# Patient Record
Sex: MALE | Race: WHITE | NOT HISPANIC OR LATINO | Employment: FULL TIME | RURAL
[De-identification: names, ages, dates, MRNs, and addresses within clinical notes are randomized per-mention and may not be internally consistent; named-entity substitution may affect disease eponyms.]

---

## 2020-03-15 ENCOUNTER — OFFICE VISIT (OUTPATIENT)
Dept: RETAIL CLINIC | Facility: CLINIC | Age: 30
End: 2020-03-15

## 2020-03-15 VITALS
WEIGHT: 249 LBS | DIASTOLIC BLOOD PRESSURE: 82 MMHG | SYSTOLIC BLOOD PRESSURE: 130 MMHG | TEMPERATURE: 98.5 F | HEIGHT: 73 IN | HEART RATE: 85 BPM | RESPIRATION RATE: 15 BRPM | OXYGEN SATURATION: 95 % | BODY MASS INDEX: 33 KG/M2

## 2020-03-15 DIAGNOSIS — J06.9 VIRAL UPPER RESPIRATORY TRACT INFECTION: Primary | ICD-10-CM

## 2020-03-15 DIAGNOSIS — Z20.828 EXPOSURE TO INFLUENZA: ICD-10-CM

## 2020-03-15 LAB
EXPIRATION DATE: NORMAL
EXPIRATION DATE: NORMAL
FLUAV AG NPH QL: NEGATIVE
FLUBV AG NPH QL: NEGATIVE
INTERNAL CONTROL: NORMAL
INTERNAL CONTROL: NORMAL
Lab: 1598
Lab: NORMAL
S PYO AG THROAT QL: NEGATIVE

## 2020-03-15 PROCEDURE — 87804 INFLUENZA ASSAY W/OPTIC: CPT | Performed by: NURSE PRACTITIONER

## 2020-03-15 PROCEDURE — 87880 STREP A ASSAY W/OPTIC: CPT | Performed by: NURSE PRACTITIONER

## 2020-03-15 PROCEDURE — 99213 OFFICE O/P EST LOW 20 MIN: CPT | Performed by: NURSE PRACTITIONER

## 2020-03-15 RX ORDER — OSELTAMIVIR PHOSPHATE 75 MG/1
75 CAPSULE ORAL
Qty: 10 CAPSULE | Refills: 0 | Status: SHIPPED | OUTPATIENT
Start: 2020-03-15 | End: 2020-03-20

## 2020-03-15 NOTE — PROGRESS NOTES
"Manolo Ruiz is a 30 y.o. male.   /82   Pulse 85   Temp 98.5 °F (36.9 °C)   Resp 15   Ht 185.4 cm (73\")   Wt 113 kg (249 lb)   SpO2 95%   BMI 32.85 kg/m²   Past Medical History:   Diagnosis Date   • Asthma      No Known Allergies      URI    This is a new problem. The current episode started yesterday (past 2 days). The problem has been unchanged. Associated symptoms include congestion, coughing and rhinorrhea. Pertinent negatives include no abdominal pain, chest pain, diarrhea, dysuria, ear pain, headaches, joint pain, joint swelling, nausea, neck pain, plugged ear sensation, rash, sinus pain, sneezing, sore throat, swollen glands, vomiting or wheezing.   Cough   Associated symptoms include a fever and rhinorrhea. Pertinent negatives include no chest pain, ear pain, headaches, rash, sore throat or wheezing.   Fever    Associated symptoms include congestion and coughing. Pertinent negatives include no abdominal pain, chest pain, diarrhea, ear pain, headaches, nausea, rash, sore throat, urinary pain, vomiting or wheezing.        The following portions of the patient's history were reviewed and updated as appropriate: allergies, current medications, past family history, past medical history, past social history, past surgical history and problem list.    Review of Systems   Constitutional: Positive for fever.   HENT: Positive for congestion and rhinorrhea. Negative for ear pain, sinus pain, sneezing and sore throat.    Respiratory: Positive for cough. Negative for wheezing.    Cardiovascular: Negative for chest pain.   Gastrointestinal: Negative for abdominal pain, diarrhea, nausea and vomiting.   Genitourinary: Negative for dysuria.   Musculoskeletal: Negative for joint pain and neck pain.   Skin: Negative for rash.   Neurological: Negative for headaches.       Objective   Physical Exam   Constitutional: He appears well-developed and well-nourished.  Non-toxic appearance. He does not appear " ill.   HENT:   Head: Normocephalic and atraumatic.   Right Ear: Tympanic membrane and ear canal normal.   Left Ear: Tympanic membrane and ear canal normal.   Nose: Mucosal edema and rhinorrhea present. Right sinus exhibits no maxillary sinus tenderness and no frontal sinus tenderness. Left sinus exhibits no maxillary sinus tenderness and no frontal sinus tenderness.   Mouth/Throat: Uvula is midline. Posterior oropharyngeal erythema present.   Cardiovascular: Regular rhythm and normal heart sounds.   Pulmonary/Chest: Effort normal. He has no wheezes. He has no rhonchi. He has no rales.   Lymphadenopathy:     He has no cervical adenopathy.   Skin: Skin is warm and dry.       Assessment/Plan   Juan Jose was seen today for uri, cough and fever.    Diagnoses and all orders for this visit:    Viral upper respiratory tract infection  -     POC Influenza A / B    Exposure to influenza  -     POC Rapid Strep A    Other orders  -     oseltamivir (TAMIFLU) 75 MG capsule; Take 1 capsule by mouth 2 (Two) Times a Day for 5 days.        Results for orders placed or performed in visit on 03/15/20   POC Rapid Strep A   Result Value Ref Range    Rapid Strep A Screen Negative Negative, VALID, INVALID, Not Performed    Internal Control Passed Passed    Lot Number [id number]     Expiration Date 7,152,022    POC Influenza A / B   Result Value Ref Range    Rapid Influenza A Ag Negative Negative    Rapid Influenza B Ag Negative Negative    Internal Control Passed Passed    Lot Number [id number]     Expiration Date 1,052,023        PT advised to remain out of public for symptoms resolution. If symptoms worsen or do not improve seek a higher level of medical care. Pt states understanding.

## 2024-04-15 PROBLEM — J30.1 SEASONAL ALLERGIC RHINITIS DUE TO POLLEN: Status: ACTIVE | Noted: 2024-04-15

## 2024-04-15 PROBLEM — Z87.891 PERSONAL HISTORY OF TOBACCO USE, PRESENTING HAZARDS TO HEALTH: Status: ACTIVE | Noted: 2024-04-15

## 2024-04-15 PROBLEM — E66.09 CLASS 1 OBESITY DUE TO EXCESS CALORIES WITHOUT SERIOUS COMORBIDITY WITH BODY MASS INDEX (BMI) OF 32.0 TO 32.9 IN ADULT: Status: ACTIVE | Noted: 2024-04-15

## 2024-04-15 PROBLEM — Z00.01 ENCOUNTER FOR GENERAL ADULT MEDICAL EXAMINATION WITH ABNORMAL FINDINGS: Status: ACTIVE | Noted: 2024-04-15

## 2024-04-16 ENCOUNTER — OFFICE VISIT (OUTPATIENT)
Dept: FAMILY MEDICINE CLINIC | Facility: CLINIC | Age: 34
End: 2024-04-16
Payer: COMMERCIAL

## 2024-04-16 VITALS
BODY MASS INDEX: 34.59 KG/M2 | WEIGHT: 261 LBS | HEIGHT: 73 IN | SYSTOLIC BLOOD PRESSURE: 130 MMHG | TEMPERATURE: 97.8 F | DIASTOLIC BLOOD PRESSURE: 84 MMHG

## 2024-04-16 DIAGNOSIS — M54.50 ACUTE MIDLINE LOW BACK PAIN WITHOUT SCIATICA: Primary | ICD-10-CM

## 2024-04-16 DIAGNOSIS — R03.0 PREHYPERTENSION: ICD-10-CM

## 2024-04-16 DIAGNOSIS — J30.1 SEASONAL ALLERGIC RHINITIS DUE TO POLLEN: ICD-10-CM

## 2024-04-16 DIAGNOSIS — M51.26 PROTRUSION OF LUMBAR INTERVERTEBRAL DISC: ICD-10-CM

## 2024-04-16 DIAGNOSIS — E66.09 CLASS 1 OBESITY DUE TO EXCESS CALORIES WITHOUT SERIOUS COMORBIDITY WITH BODY MASS INDEX (BMI) OF 32.0 TO 32.9 IN ADULT: ICD-10-CM

## 2024-04-16 DIAGNOSIS — Z87.891 PERSONAL HISTORY OF TOBACCO USE, PRESENTING HAZARDS TO HEALTH: ICD-10-CM

## 2024-04-16 PROCEDURE — 99204 OFFICE O/P NEW MOD 45 MIN: CPT | Performed by: INTERNAL MEDICINE

## 2024-04-16 RX ORDER — NAPROXEN 500 MG/1
1 TABLET ORAL EVERY 12 HOURS SCHEDULED
COMMUNITY
Start: 2024-04-12

## 2024-04-16 RX ORDER — TIZANIDINE 4 MG/1
1 TABLET ORAL 3 TIMES DAILY
COMMUNITY
Start: 2024-04-12

## 2024-04-16 RX ORDER — METHYLPREDNISOLONE 4 MG/1
TABLET ORAL
COMMUNITY
Start: 2024-04-12

## 2024-04-16 NOTE — ASSESSMENT & PLAN NOTE
Previous 3/4 pack per day x8 years, cessation 2016. I reinforced importance of continued abstinence, especially in light of his history of mild intermittent asthma.

## 2024-04-16 NOTE — ASSESSMENT & PLAN NOTE
CT scan of the lumbar spine is obtained in the ER 4/12/2024 with notable L4/L5 disc protrusion and L5-S1 broad-based disc bulging, with patient with notable onset of acute back pain at the time.  With these notable findings, of which is unclear how much they relate to his acute back pain, most prudent to obtain MRI of the lumbar spine to better clarify details and determine if he might benefit from neurosurgical referral.

## 2024-04-16 NOTE — ASSESSMENT & PLAN NOTE
Blood pressure in clinic at 130/84 on recheck, could be partly exacerbated by pain pattern of the back, but even so in a patient who has increasing weight pattern and relatively and activity, I would like him to check his blood pressure at home multiple times at least weekly until he follow-up in 2 months time to reassess, that will give us better information to determine if he might need medication.  Continue healthy diet, exercise, weight loss, decrease salt intake to benefit blood pressure.

## 2024-04-16 NOTE — ASSESSMENT & PLAN NOTE
Notable pattern obesity with BMI in the 34 range, the patient's had modest weight gain in the last months coinciding with less activity.  Reinforced importance healthy diet, exercise and further weight loss.  Plan to check cholesterol with blood work at follow-up visit.   Acute bronchitis due to COVID-19 virus

## 2024-04-16 NOTE — ASSESSMENT & PLAN NOTE
Acute onset of back pain 4 days ago on 4/12/2024 while he was putting his clothes on, with significant pain and what appears to be the more upper and mid lumbar region, more midline and slightly left-sided.  No concerning neurologic manifestations, by history or exam.  4/12/2024 [facility] ER CT lumbar spine revealed disc bulges at L4/L5 and L5-S1.  Patient has done quite well with conservative manage with improvement in pain pattern but these abnormalities on CT imaging do have some concern and I think it is most prudent to assess further with MRI of the lumbar spine, with consideration he might benefit from neurosurgical evaluation to assess further.  Management per results.  Otherwise with improving pattern of pain, continue and complete course of Medrol Dosepak, transition to naproxen 5 mg twice daily for another handful days, then as needed.  The Zanaflex can be transition to as needed nighttime use as he is overall doing better in that regard.  Advise any worsening.

## 2024-04-16 NOTE — PROGRESS NOTES
Follow Up Office Visit      Date: 2024   Patient Name: Juan Jose Ruiz  : 1990   MRN: [id number]     Chief Complaint:    Chief Complaint   Patient presents with    Follow-up       History of Present Illness: Juan Jose Ruiz is a 34 y.o. male who is here today to follow up with ER follow-up.  Patient not seen in almost 4 years, with 1 visit with myself on 10/26/2020.  Acute onset of back pain 4 days ago on 2024 while he was putting his clothes on, with significant pain and what appears to be the more upper and mid lumbar region, more midline and slightly left-sided.  Due to significance of pain, seen at [facility] ER where he was investigated with a CT lumbar spine showing disc bulges at L4/L5 and L5-S1, given Toradol, oxycodone and dexamethasone in the ER, and discharged home on Medrol Dosepak to be followed by naproxen 5 mg twice daily and given Zanaflex for muscle aches and benefits.  Patient does not describe any previous pattern or any notable back pain.  He has had no bowel or bladder incontinence.  No shooting pain down the legs, though the pain was quite intense but it is improved from about a 10 out of 10 initially now to about a 2-3 out of 10.  Good strength in the legs, no nightly sensation in the legs.  He still has another couple days of the Medrol Dosepak to complete.    Related to allergy symptoms are ongoing historically, some modest flare at this time but he is not bothersome enough to initiate medication, but he has historically used over-the-counter antihistamines with benefit.  Related to obesity pattern, modest increasing weight over the last months coinciding with increased workload, which he knows he needs to continue improving.  He also has a history of smoking, continue cessation.    Subjective      Review of Systems:   Review of Systems    I have reviewed the patients family history, social history, past medical history, past surgical history and have updated  "it as appropriate.     Medications:     Current Outpatient Medications:     methylPREDNISolone (MEDROL) 4 MG dose pack, TAKE 6 TABLETS ON DAY 1 AS DIRECTED ON PACKAGE AND DECREASE BY 1 TAB EACH DAY FOR A TOTAL OF 6 DAYS, Disp: , Rfl:     naproxen (NAPROSYN) 500 MG tablet, Take 1 tablet by mouth Every 12 (Twelve) Hours., Disp: , Rfl:     tiZANidine (ZANAFLEX) 4 MG tablet, Take 1 tablet by mouth 3 times a day., Disp: , Rfl:     Allergies:   No Known Allergies    Objective     Physical Exam: Please see above  Vital Signs:   Vitals:    04/16/24 1637   BP: 130/84   BP Location: Right arm   Patient Position: Sitting   Cuff Size: Adult   Temp: 97.8 °F (36.6 °C)   TempSrc: Temporal   Weight: 118 kg (261 lb)   Height: 185.4 cm (73\")     Facility age limit for growth %hank is 20 years.  Body mass index is 34.43 kg/m².    Physical Exam  Constitutional:       General: He is not in acute distress.     Appearance: Normal appearance. He is not ill-appearing, toxic-appearing or diaphoretic.   HENT:      Right Ear: Ear canal and external ear normal.      Left Ear: Ear canal and external ear normal.      Ears:      Comments: Mild fluid behind the TMs on the right great left side, otherwise clear     Nose: Rhinorrhea present.      Comments: Mild clear rhinorrhea, pale Entresto     Mouth/Throat:      Mouth: Mucous membranes are moist.      Pharynx: Oropharynx is clear. No oropharyngeal exudate or posterior oropharyngeal erythema.   Neck:      Vascular: No carotid bruit.   Cardiovascular:      Rate and Rhythm: Normal rate and regular rhythm.      Pulses: Normal pulses.      Heart sounds: Normal heart sounds. No murmur heard.     No friction rub. No gallop.   Pulmonary:      Effort: Pulmonary effort is normal. No respiratory distress.      Breath sounds: Normal breath sounds. No stridor. No wheezing.   Abdominal:      General: Abdomen is flat. Bowel sounds are normal. There is no distension.      Palpations: Abdomen is soft. There is no " "mass.      Tenderness: There is no abdominal tenderness. There is no guarding or rebound.      Hernia: No hernia is present.   Musculoskeletal:      Cervical back: Neck supple. No tenderness.      Comments: No C/T/L spinous process tenderness.  Paraspinal muscular tenderness at the left greater than right upper and mid lumbar regions, with negative flip sign bilaterally.  Preservation of strength sensation and reflexes in lower extremities.   Lymphadenopathy:      Cervical: No cervical adenopathy.   Skin:     General: Skin is warm and dry.      Capillary Refill: Capillary refill takes less than 2 seconds.   Neurological:      General: No focal deficit present.      Mental Status: He is alert and oriented to person, place, and time. Mental status is at baseline.   Psychiatric:         Mood and Affect: Mood normal.         Behavior: Behavior normal.         Thought Content: Thought content normal.         Procedures    Results:   Labs:   No results found for: \"HGBA1C\", \"CMP\", \"CBCDIFFPANEL\", \"CREAT\", \"TSH\"     Imaging:   No valid procedures specified.     BMI is >= 30 and <35. (Class 1 Obesity). The following options were offered after discussion;: weight loss educational material (shared in after visit summary), exercise counseling/recommendations, and nutrition counseling/recommendations      Assessment / Plan      Assessment/Plan:   Diagnoses and all orders for this visit:    1. Acute midline low back pain without sciatica (Primary)  Assessment & Plan:  Acute onset of back pain 4 days ago on 4/12/2024 while he was putting his clothes on, with significant pain and what appears to be the more upper and mid lumbar region, more midline and slightly left-sided.  No concerning neurologic manifestations, by history or exam.  4/12/2024 [facility] ER CT lumbar spine revealed disc bulges at L4/L5 and L5-S1.  Patient has done quite well with conservative manage with improvement in pain pattern but these " abnormalities on CT imaging do have some concern and I think it is most prudent to assess further with MRI of the lumbar spine, with consideration he might benefit from neurosurgical evaluation to assess further.  Management per results.  Otherwise with improving pattern of pain, continue and complete course of Medrol Dosepak, transition to naproxen 5 mg twice daily for another handful days, then as needed.  The Zanaflex can be transition to as needed nighttime use as he is overall doing better in that regard.  Advise any worsening.    Orders:  -     MRI Lumbar Spine Without Contrast; Future    2. Protrusion of lumbar intervertebral disc  Assessment & Plan:  CT scan of the lumbar spine is obtained in the ER 4/12/2024 with notable L4/L5 disc protrusion and L5-S1 broad-based disc bulging, with patient with notable onset of acute back pain at the time.  With these notable findings, of which is unclear how much they relate to his acute back pain, most prudent to obtain MRI of the lumbar spine to better clarify details and determine if he might benefit from neurosurgical referral.    Orders:  -     MRI Lumbar Spine Without Contrast; Future    3. Prehypertension  Assessment & Plan:  Blood pressure in clinic at 130/84 on recheck, could be partly exacerbated by pain pattern of the back, but even so in a patient who has increasing weight pattern and relatively and activity, I would like him to check his blood pressure at home multiple times at least weekly until he follow-up in 2 months time to reassess, that will give us better information to determine if he might need medication.  Continue healthy diet, exercise, weight loss, decrease salt intake to benefit blood pressure.      4. Seasonal allergic rhinitis due to pollen  Assessment & Plan:  Seasonal pattern of allergies more spring and fall historically although doing better at this time for which she uses infrequent antihistamine over-the-counter with some benefit.   Currently being fairly well.  We could add nasal steroid and/or Singulair in future for breakthrough symptoms.  Additional benefit of saline spray, nasal flushing.      5. Class 1 obesity due to excess calories without serious comorbidity with body mass index (BMI) of 32.0 to 32.9 in adult  Assessment & Plan:  Notable pattern obesity with BMI in the 34 range, the patient's had modest weight gain in the last months coinciding with less activity.  Reinforced importance healthy diet, exercise and further weight loss.  Plan to check cholesterol with blood work at follow-up visit.      6. Personal history of tobacco use, presenting hazards to health  Assessment & Plan:  Previous 3/4 pack per day x8 years, cessation 2016. I reinforced importance of continued abstinence, especially in light of his history of mild intermittent asthma.           Follow Up:   Return in about 2 months (around 6/16/2024) for Annual physical.        Syed Gold MD  [facility]

## 2024-04-25 ENCOUNTER — TELEPHONE (OUTPATIENT)
Dept: FAMILY MEDICINE CLINIC | Facility: CLINIC | Age: 34
End: 2024-04-25
Payer: COMMERCIAL

## 2024-04-25 DIAGNOSIS — M54.50 ACUTE MIDLINE LOW BACK PAIN WITHOUT SCIATICA: ICD-10-CM

## 2024-04-25 DIAGNOSIS — M51.26 PROTRUSION OF LUMBAR INTERVERTEBRAL DISC: ICD-10-CM

## 2024-04-25 DIAGNOSIS — M54.50 ACUTE MIDLINE LOW BACK PAIN WITHOUT SCIATICA: Primary | ICD-10-CM

## 2024-04-25 NOTE — TELEPHONE ENCOUNTER
Review of MRI of the lumbar spine without contrast as obtained 4/24/2024 at [facility].  Results reveal mild lower lumbar degenerative changes with an L5-L1 left posterolateral disc protrusion producing mild to moderate neuroforaminal narrowing, otherwise a small annular bulge at L4-L5, with no other concerns.  This is comparison 4/12/2024 CT lumbar spine showing disc bulges at L4-L5 and L5-S1.    Patient is clinically doing better, still no radiculopathy type signs, good strength in the legs, pain pattern has significantly improved, only little residual hint of discomfort in the lower back.  He would be agreeable to physical therapy to evaluate and treat I will set that up for him and plan to follow-up in 2 months time.  In context of how he is doing there would be not in need for neurosurgical evaluation, although he has been advised to let me know if anything changes.